# Patient Record
Sex: FEMALE | Race: WHITE | NOT HISPANIC OR LATINO | Employment: FULL TIME | URBAN - METROPOLITAN AREA
[De-identification: names, ages, dates, MRNs, and addresses within clinical notes are randomized per-mention and may not be internally consistent; named-entity substitution may affect disease eponyms.]

---

## 2022-09-13 ENCOUNTER — OFFICE VISIT (OUTPATIENT)
Dept: PHYSICAL THERAPY | Facility: REHABILITATION | Age: 27
End: 2022-09-13
Payer: COMMERCIAL

## 2022-09-13 DIAGNOSIS — R41.840 COVID-19 LONG HAULER MANIFESTING CHRONIC CONCENTRATION DEFICIT: Primary | ICD-10-CM

## 2022-09-13 DIAGNOSIS — U09.9 COVID-19 LONG HAULER MANIFESTING CHRONIC CONCENTRATION DEFICIT: Primary | ICD-10-CM

## 2022-09-13 PROCEDURE — 97162 PT EVAL MOD COMPLEX 30 MIN: CPT | Performed by: PHYSICAL THERAPIST

## 2022-09-13 NOTE — PROGRESS NOTES
PT Evaluation     Today's date: 2022  Patient name: Trevon Shaikh  : 1995  MRN: [id number]  Referring provider: Harriet Aviles PT  Dx:   Encounter Diagnosis     ICD-10-CM    1  COVID-19 long hauler manifesting chronic concentration deficit  [id number]     U09 9                   Assessment  Assessment details: Volodymyr Mclean comes to PT for direct access evaluation with CC of brain fog following a COVID-19 infection in 2021  From a physical perspective she is not having any impairments or functional limitations per her reports, however neurocognitive screen was performed today to determine appropriateness for referral  She demonstrated mild neurocognitive dysfunction today per SLUMS examination today  Findings and hx were discussed with occupational therapy today who agreed referral to OT for evaluation would be appropriate for further testing and treatment to address these impairments  As a result of these impairments she is experiencing difficulty with performing her job duties as a  along with recreational graphic design  Educated pt today on these findings along with my assessment of possible causes of her symptoms including depression, COVID long haulers syndrome, removal from normal routine, or functional neurocognitive disorder  Recommended follow up with her PCP or behavioral health therapy to address her depression as this is likely contributing factor to her brain fog  Physical therapy is not indicated at this time  She would benefit from referral to OT evaluation  Barriers to therapy: Depression     Goals  N/A    Plan  Plan details: PT is not indicated at this time  Referral to OT for eval  Will contact PCP regarding script     Patient would benefit from: OT eval  Plan of Care beginning date: 2022  Plan of Care expiration date: 2022  Treatment plan discussed with: patient        Subjective Evaluation    History of Present Illness  Mechanism of injury: Pt comes to PT with CC of brain fog following a COVID 19 infection in March of 2021  She was sick for about 1 week with no hospitalization  States that at the time she was working a very stressful job so at first she just through she was burnt out from work, but continued noticing issues with her new job  Works at a warehouse collecting orders  Is on he feet walking all day, but states the lifting is light  Since being sick, this issues has been about the same  Feels exhausted and un-motivated to do things at work  "I feel stupid"  With conversation, can't think of a specific word sometimes  Feels her memory has worsened  No issues with this prior to being sick  Reports having some depression before being sick, but has been more pronounced since  Has talked with her PCP about this who wanted her to go on medication, but has not at this time  Falls asleep well, not waking up frequently at night, sleeps 6 hours on average  If she sleeps 8-9 hours still feels tired when waking up  Denies any physical chronic fatigue, shortness of breath, or pain  Does cardio and stretching w/o any difficulty  Goals: be motivated to get duties done at work, and do graphic art again  Other Med Hx: depression  Meds: none  Allergies: nickel metal         Objective         Vitals:   - BP: 120/84  - HR:  81  - P02:  99%       Due nature of patient's CC testing of strength, functional endurance, pulmonary function, and balance were not indicated today      SLUMS: 24/30 indicating mild neurocognitive disorder (see scanned into media)           Precautions: depression       Manuals                                                                 Neuro Re-Ed             Education  COVID long haulers, physiological cuases of c[place], functional neurocognitive disorders 10'                                                                                          Ther Ex Ther Activity                                       Gait Training                                       Modalities